# Patient Record
Sex: MALE | Race: OTHER | URBAN - METROPOLITAN AREA
[De-identification: names, ages, dates, MRNs, and addresses within clinical notes are randomized per-mention and may not be internally consistent; named-entity substitution may affect disease eponyms.]

---

## 2022-02-08 ENCOUNTER — OFFICE VISIT (OUTPATIENT)
Dept: URGENT CARE | Age: 22
End: 2022-02-08

## 2022-02-08 VITALS
OXYGEN SATURATION: 99 % | TEMPERATURE: 99.1 F | HEART RATE: 67 BPM | SYSTOLIC BLOOD PRESSURE: 130 MMHG | DIASTOLIC BLOOD PRESSURE: 70 MMHG | RESPIRATION RATE: 16 BRPM | WEIGHT: 143 LBS

## 2022-02-08 DIAGNOSIS — M25.571 ACUTE RIGHT ANKLE PAIN: ICD-10-CM

## 2022-02-08 DIAGNOSIS — S93.401A SPRAIN OF RIGHT ANKLE, UNSPECIFIED LIGAMENT, INITIAL ENCOUNTER: ICD-10-CM

## 2022-02-08 DIAGNOSIS — R07.81 PLEURITIC CHEST PAIN: Primary | ICD-10-CM

## 2022-02-08 PROCEDURE — 99203 OFFICE O/P NEW LOW 30 MIN: CPT | Performed by: FAMILY MEDICINE

## 2022-02-08 NOTE — PROGRESS NOTES
Ayana Haas is a 25 y.o. male who presents with right ankle pain at the lateral aspect x 3 days; reports pain started after twisting ankle while skating. Able to bear weight without pain. Also reports left sided chest pain with deep breathing since this AM, has improved. Denies cough, fever, SOB. Mother reports recently recovered from [facility]. The history is provided by the patient.         Past Medical History:   Diagnosis Date    Allergic rhinitis     Diarrhea     diarrhea for past 6 weeks; just starting to clear    Infectious mononucleosis Oct. 2010    Seasonal allergic rhinitis 4/9/2012    Wears glasses         Past Surgical History:   Procedure Laterality Date    HX OTHER SURGICAL      testicle surgery         Family History   Problem Relation Age of Onset    Anxiety Mother     Colon Polyps Mother    [place] Migraines Mother     Other Maternal Grandmother         diverticulosis    Colon Polyps Maternal Grandmother     Elevated Lipids Maternal Grandfather     Hypertension Maternal Grandfather     Cancer Paternal Grandmother 54        ovarian    Heart Disease Paternal Grandfather         Social History     Socioeconomic History    Marital status: SINGLE     Spouse name: Not on file    Number of children: Not on file    Years of education: Not on file    Highest education level: Not on file   Occupational History    Not on file   Tobacco Use    Smoking status: Never Smoker    Smokeless tobacco: Never Used   Substance and Sexual Activity    Alcohol use: No    Drug use: No    Sexual activity: Not on file   Other Topics Concern    Not on file   Social History Narrative    Not on file     Social Determinants of Health     Financial Resource Strain:     Difficulty of Paying Living Expenses: Not on file   Food Insecurity:     Worried About Running Out of Food in the Last Year: Not on file    Thuy of Food in the Last Year: Not on file   Transportation Needs:     Lack of Transportation (Medical): Not on file    Lack of Transportation (Non-Medical): Not on file   Physical Activity:     Days of Exercise per Week: Not on file    Minutes of Exercise per Session: Not on file   Stress:     Feeling of Stress : Not on file   Social Connections:     Frequency of Communication with Friends and Family: Not on file    Frequency of Social Gatherings with Friends and Family: Not on file    Attends Latter day Services: Not on file    Active Member of [address] [place] [address] [employer] or Organizations: Not on file    Attends Club or Organization Meetings: Not on file    Marital Status: Not on file   Intimate Partner Violence:     Fear of Current or Ex-Partner: Not on file    Emotionally Abused: Not on file    Physically Abused: Not on file    Sexually Abused: Not on file   Housing Stability:     Unable to Pay for Housing in the Last Year: Not on file    Number of [place] in the Last Year: Not on file    Unstable Housing in the Last Year: Not on file                ALLERGIES: Patient has no known allergies. Review of Systems   Respiratory: Negative for shortness of breath. Cardiovascular: Positive for chest pain. Musculoskeletal: Positive for arthralgias. Negative for gait problem and joint swelling. Skin: Negative for wound. Vitals:    02/08/22 1754   BP: 130/70   Pulse: 67   Resp: 16   Temp: 99.1 °F (37.3 °C)   SpO2: 99%   Weight: 143 lb (64.9 kg)       Physical Exam  Vitals and nursing note reviewed. Constitutional:       General: He is not in acute distress. Appearance: He is well-developed. He is not diaphoretic. Cardiovascular:      Rate and Rhythm: Normal rate and regular rhythm. Pulmonary:      Effort: Pulmonary effort is normal. No respiratory distress. Breath sounds: Normal breath sounds. No stridor. No wheezing, rhonchi or rales. Chest:      Chest wall: No tenderness. Musculoskeletal:      Right lower leg: No swelling or tenderness (negative ying's sign). No edema. Right ankle: No swelling. Tenderness present over the lateral malleolus. No medial malleolus, ATF ligament, AITF ligament, posterior TF ligament, base of 5th metatarsal or proximal fibula tenderness. Normal range of motion. Neurological:      Mental Status: He is alert. Psychiatric:         Behavior: Behavior normal.         Thought Content: Thought content normal.         Judgment: Judgment normal.         MDM    ICD-10-CM ICD-9-CM   1. Pleuritic chest pain  R07.81 786.52   2. Acute right ankle pain  M25.571 719.47     338.19   3. Sprain of right ankle, unspecified ligament, initial encounter  S93.401A 845.00       Orders Placed This Encounter    XR CHEST PA LAT     Standing Status:   Future     Number of Occurrences:   1     Standing Expiration Date:   3/11/2023     Order Specific Question:   Reason for Exam     Answer:   left sided CP with deep breathing since this AM    XR ANKLE RT MIN 3 V     Standing Status:   Future     Number of Occurrences:   1     Standing Expiration Date:   2/9/2023     Order Specific Question:   Reason for Exam     Answer:   twisted right ankle while skating 3 days ago, tenderness at lateral malleolus      Elevate right ankle at rest  Deep breathing exercises  Ibuprofen as needed    Low concern for DVT/PE; VSS, no SOB, no calf pain/swelling  Follow up with pcp  ER if short of breath or worsening chest pain    Discussed with Mother    XR Results (most recent):  Results from Appointment encounter on 02/08/22    XR ANKLE RT MIN 3 V    Narrative  EXAM: XR ANKLE RT MIN 3 V    INDICATION: twisted right ankle while skating 3 days ago, tenderness at lateral  malleolus. COMPARISON: None. FINDINGS: Three views of the right ankle demonstrate no fracture or disruption  of the ankle mortise. There there is an ankle effusion and lateral soft tissue  swelling.     Impression  Acute right lateral ankle sprain    Study Result    Narrative & Impression   EXAM: XR CHEST PA LAT     INDICATION: left sided CP with deep breathing since this AM     COMPARISON: 10/20/2010.     FINDINGS: PA and lateral radiographs of the chest demonstrate clear lungs.  The  cardiac and mediastinal contours and pulmonary vascularity are normal. The bones  and soft tissues are within normal limits.      IMPRESSION  Normal chest.       Procedures

## 2022-02-08 NOTE — PATIENT INSTRUCTIONS
Elevate right ankle at rest  Deep breathing exercises  Ibuprofen as needed  Follow up with pcp  ER if short of breath or worsening chest pain       Pleurisy: Care Instructions  Your Care Instructions  Pleurisy is inflammation of the tissue that lines the inside of the chest and covers the lungs (pleura). Pleurisy is often caused by an infection, usually a virus. It also can be caused by other health problems, such as pneumonia or lupus. Pleurisy can cause sharp chest pain that gets worse when you cough or take a deep breath. You may need more tests to find out what is causing your pleurisy. Treatment depends on the cause. Pleurisy may come and go for a few days, or it may continue if the cause has not been treated. Home treatment can help ease symptoms. Follow-up care is a key part of your treatment and safety. Be sure to make and go to all appointments, and call your doctor if you are having problems. It's also a good idea to know your test results and keep a list of the medicines you take. How can you care for yourself at home? · Take an over-the-counter pain medicine, such as acetaminophen (Tylenol), ibuprofen (Advil, Motrin), or naproxen (Aleve). Read and follow all instructions on the label. · Do not take two or more pain medicines at the same time unless the doctor told you to. Many pain medicines have acetaminophen, which is Tylenol. Too much acetaminophen (Tylenol) can be harmful. · If your doctor prescribed antibiotics, take them as directed. Do not stop taking them just because you feel better. You need to take the full course of antibiotics. · Take cough medicine as directed if your doctor recommends it. · Avoid activities that make the pain worse. When should you call for help? Call 911 anytime you think you may need emergency care. For example, call if:    · You have severe trouble breathing.     · You have severe chest pain.     · You passed out (lost consciousness).    Call your doctor now or seek immediate medical care if:    · You have a new or higher fever. Watch closely for changes in your health, and be sure to contact your doctor if:    · You begin to cough up yellow or green mucus.     · You cough up blood.     · Your symptoms are not better in 3 or 4 days. Where can you learn more? Go to http://www.gray.com/  Enter F346 in the search box to learn more about \"Pleurisy: Care Instructions. \"  Current as of: July 6, 2021               Content Version: 13.0  © 8536-2856 [employer]. Care instructions adapted under license by [employer] (which disclaims liability or warranty for this information). If you have questions about a medical condition or this instruction, always ask your healthcare professional. [address] any warranty or liability for your use of this information. Ankle Sprain: Care Instructions  Your Care Instructions     An ankle sprain can happen when you twist your ankle. The ligaments that support the ankle can get stretched and torn. Often the ankle is swollen and painful. Ankle sprains may take from several weeks to several months to heal. Usually, the more pain and swelling you have, the more severe your ankle sprain is and the longer it will take to heal. You can heal faster and regain strength in your ankle with good home treatment. It is very important to give your ankle time to heal completely, so that you do not easily hurt your ankle again. Follow-up care is a key part of your treatment and safety. Be sure to make and go to all appointments, and call your doctor if you are having problems. It's also a good idea to know your test results and keep a list of the medicines you take. How can you care for yourself at home? · Prop up your foot on pillows as much as possible for the next 3 days. Try to keep your ankle above the level of your heart.  This will help reduce the swelling. · Follow your doctor's directions for wearing a splint or elastic bandage. Wrapping the ankle may help reduce or prevent swelling. · Your doctor may give you a splint, a brace, an air stirrup, or another form of ankle support to protect your ankle until it is healed. Wear it as directed while your ankle is healing. Do not remove it unless your doctor tells you to. After your ankle has healed, ask your doctor whether you should wear the brace when you exercise. · Put ice or cold packs on your injured ankle for 10 to 20 minutes at a time. Try to do this every 1 to 2 hours for the next 3 days (when you are awake) or until the swelling goes down. Put a thin cloth between the ice and your skin. · You may need to use crutches until you can walk without pain. If you do use crutches, try to bear some weight on your injured ankle if you can do so without pain. This helps the ankle heal.  · Take pain medicines exactly as directed. ? If the doctor gave you a prescription medicine for pain, take it as prescribed. ? If you are not taking a prescription pain medicine, ask your doctor if you can take an over-the-counter medicine. · If you have been given ankle exercises to do at home, do them exactly as instructed. These can promote healing and help prevent lasting weakness. When should you call for help? Call your doctor now or seek immediate medical care if:    · Your pain is getting worse.     · Your swelling is getting worse.     · Your splint feels too tight or you are unable to loosen it. Watch closely for changes in your health, and be sure to contact your doctor if:    · You are not getting better after 1 week. Where can you learn more? Go to http://www.gray.com/  Enter T341 in the search box to learn more about \"Ankle Sprain: Care Instructions. \"  Current as of: July 1, 2021               Content Version: 13.0  © 0209-7426 [employer], [employer].    Care instructions adapted under license by [employer] (which disclaims liability or warranty for this information). If you have questions about a medical condition or this instruction, always ask your healthcare professional. [address] any warranty or liability for your use of this information.

## 2022-02-11 ENCOUNTER — OFFICE VISIT (OUTPATIENT)
Dept: ORTHOPEDIC SURGERY | Age: 22
End: 2022-02-11
Payer: COMMERCIAL

## 2022-02-11 DIAGNOSIS — S63.501A RIGHT WRIST SPRAIN, INITIAL ENCOUNTER: ICD-10-CM

## 2022-02-11 DIAGNOSIS — M25.531 RIGHT WRIST PAIN: Primary | ICD-10-CM

## 2022-02-11 PROCEDURE — 99203 OFFICE O/P NEW LOW 30 MIN: CPT | Performed by: PHYSICIAN ASSISTANT

## 2022-02-11 NOTE — PROGRESS NOTES
HPI: Imer Mederos (: 2000) is a 25 y.o. male, patient, here for evaluation of the following chief complaint(s): Patient presents with right wrist pain which began 3 months ago after falling off of his skateboard. He has not been evaluated and continues to experience pain at the ulnar aspect of the wrist in various positions. He also states that with his wrist extended and with weightbearing he has pain as well. No other complaints or concerns. He is accompanied by his mother. X-rays of the right wrist obtained today. Wrist Pain (Right wrist pain after falling from skateboard 3 months ago)       Vitals: There were no vitals taken for this visit. There is no height or weight on file to calculate BMI. No Known Allergies    Current Outpatient Medications   Medication Sig    cetirizine (ZYRTEC) 10 mg tablet Take 10 mg by mouth daily.  multivitamin (ONE A DAY) tablet Take 1 Tab by mouth daily. (Patient not taking: Reported on 2022)    mometasone (NASONEX) 50 mcg/actuation nasal spray 2 Sprays by Both Nostrils route daily. (Patient not taking: Reported on 2022)     No current facility-administered medications for this visit.        Past Medical History:   Diagnosis Date    Allergic rhinitis     Diarrhea     diarrhea for past 6 weeks; just starting to clear    Infectious mononucleosis Oct. 2010    Seasonal allergic rhinitis 2012    Wears glasses         Past Surgical History:   Procedure Laterality Date    HX OTHER SURGICAL      testicle surgery       Family History   Problem Relation Age of Onset    Anxiety Mother     Colon Polyps Mother    [facility] Migraines Mother     Other Maternal Grandmother         diverticulosis    Colon Polyps Maternal Grandmother     Elevated Lipids Maternal Grandfather     Hypertension Maternal Grandfather     Cancer Paternal Grandmother 54        ovarian    Heart Disease Paternal Grandfather         Social History     Tobacco Use    Smoking status: Never Smoker    Smokeless tobacco: Never Used   Substance Use Topics    Alcohol use: No    Drug use: No        Review of Systems    Constitutional: No fevers, chills, night sweats, excessive fatigue or weight loss. Musculoskeletal: No joint pain, swelling or redness. No decreased range of motion. Neurologic: No headache, blurred vision, and no areas of focal weakness or numbness. Normal gait. No sensory problems. Respiratory: No dyspnea on exertion, orthopnea, chest pain, cough or hemoptysis. Cardiovascular: No anginal chest pain, irregular heart beat, tachycardia, palpitations or orthopnea  Integumentary: No chronic rashes, inflammation, ulcerations, pruritus, petechiae, purpura, ecchymoses, or skin changes    ROS     Positive for: Musculoskeletal    Last edited by Trav Maynard on 2/11/2022  9:47 AM. (History)             Physical Exam    General: Alert, cooperative, no distress  Musculosketal: Right wrist - Normal range of motion. Normal sensation. No erythema, edema or warmth to the joint. No cysts appreciated at the dorsal or volar aspects. No angulation. Tenderness to palpation along the ulnar aspect of the wrist. Discomfort reproduced with ulnar and radial deviation. Neurologic:  CNII-XII intact, Normal strength, sensation, and reflexes throughout    XR Results (most recent):  Results from Appointment encounter on 02/11/22    XR WRIST RT AP/LAT/OBL MIN 3V    Narrative  Normal osseous and joint space findings. No clear fractures appreciated. ASSESSMENT/PLAN:  Below is the assessment and plan developed based on review of pertinent history, physical exam, labs, studies, and medications. Right wrist pain which began 3 months ago after falling off of his skateboard. He has not been evaluated and continues to experience pain at the ulnar aspect of the wrist in various positions. He also states that with his wrist extended and with weightbearing he has pain as well.   Clinical exam is consistent with possible occult cyst or TFCC derangement. We will obtain an MRI of the right wrist for further evaluation. Patient will follow up after the MRI. In the meantime, he can try a Velcro strap wrist splint for comfort and support during activity and sleep as needed. 1. Right wrist pain  -     XR WRIST RT AP/LAT/OBL MIN 3V; Future  -     MRI WRIST RT WO CONT; Future  2. Right wrist sprain, initial encounter      Return in about 4 weeks (around 3/11/2022). Dr. Verita Goodpasture was available for immediate consult during this encounter. An electronic signature was used to authenticate this note.   -- Fenton Snellen, PA-C

## 2022-02-19 ENCOUNTER — OFFICE VISIT (OUTPATIENT)
Dept: URGENT CARE | Age: 22
End: 2022-02-19
Payer: COMMERCIAL

## 2022-02-19 VITALS
DIASTOLIC BLOOD PRESSURE: 69 MMHG | BODY MASS INDEX: 18.35 KG/M2 | OXYGEN SATURATION: 99 % | SYSTOLIC BLOOD PRESSURE: 111 MMHG | TEMPERATURE: 98.6 F | RESPIRATION RATE: 14 BRPM | HEART RATE: 76 BPM | WEIGHT: 143 LBS | HEIGHT: 74 IN

## 2022-02-19 DIAGNOSIS — M25.572 ACUTE LEFT ANKLE PAIN: ICD-10-CM

## 2022-02-19 DIAGNOSIS — S93.402A SPRAIN OF LEFT ANKLE, UNSPECIFIED LIGAMENT, INITIAL ENCOUNTER: Primary | ICD-10-CM

## 2022-02-19 DIAGNOSIS — M79.672 ACUTE FOOT PAIN, LEFT: ICD-10-CM

## 2022-02-19 PROCEDURE — 99213 OFFICE O/P EST LOW 20 MIN: CPT | Performed by: FAMILY MEDICINE

## 2022-02-19 NOTE — PROGRESS NOTES
Grady Gifford is a 25 y.o. male who presents with left foot/ankle pain and swelling at lateral aspect after mis-stepping going up steps and hyperextended foot. Unable to bear weight due to pain. Reports slight numbness in toes. The history is provided by the patient. Past Medical History:   Diagnosis Date    Allergic rhinitis     Diarrhea     diarrhea for past 6 weeks; just starting to clear    Infectious mononucleosis Oct. 2010    Seasonal allergic rhinitis 4/9/2012    Wears glasses         Past Surgical History:   Procedure Laterality Date    HX OTHER SURGICAL      testicle surgery         Family History   Problem Relation Age of Onset    Anxiety Mother     Colon Polyps Mother    [facility] Migraines Mother     Other Maternal Grandmother         diverticulosis    Colon Polyps Maternal Grandmother     Elevated Lipids Maternal Grandfather     Hypertension Maternal Grandfather     Cancer Paternal Grandmother 54        ovarian    Heart Disease Paternal Grandfather         Social History     Socioeconomic History    Marital status: SINGLE     Spouse name: Not on file    Number of children: Not on file    Years of education: Not on file    Highest education level: Not on file   Occupational History    Not on file   Tobacco Use    Smoking status: Never Smoker    Smokeless tobacco: Never Used   Substance and Sexual Activity    Alcohol use: No    Drug use: No    Sexual activity: Not on file   Other Topics Concern    Not on file   Social History Narrative    Not on file     Social Determinants of Health     Financial Resource Strain:     Difficulty of Paying Living Expenses: Not on file   Food Insecurity:     Worried About Running Out of Food in the Last Year: Not on file    Thuy of Food in the Last Year: Not on file   Transportation Needs:     Lack of Transportation (Medical): Not on file    Lack of Transportation (Non-Medical):  Not on file   Physical Activity:     Days of Exercise per Week: Not on file    Minutes of Exercise per Session: Not on file   Stress:     Feeling of Stress : Not on file   Social Connections:     Frequency of Communication with Friends and Family: Not on file    Frequency of Social Gatherings with Friends and Family: Not on file    Attends Mu-ism Services: Not on file    Active Member of [address] [place] [address] or Organizations: Not on file    Attends Club or Organization Meetings: Not on file    Marital Status: Not on file   Intimate Partner Violence:     Fear of Current or Ex-Partner: Not on file    Emotionally Abused: Not on file    Physically Abused: Not on file    Sexually Abused: Not on file   Housing Stability:     Unable to Pay for Housing in the Last Year: Not on file    Number of [place] in the Last Year: Not on file    Unstable Housing in the Last Year: Not on file                ALLERGIES: Patient has no known allergies. Review of Systems   Musculoskeletal: Positive for arthralgias, gait problem and joint swelling. Skin: Negative for color change. Vitals:    02/19/22 1829   BP: 111/69   Pulse: 76   Resp: 14   Temp: 98.6 °F (37 °C)   SpO2: 99%   Weight: 143 lb (64.9 kg)   Height: 6' 2\" (1.88 m)       Physical Exam  Vitals and nursing note reviewed. Constitutional:       General: He is not in acute distress. Appearance: He is well-developed. He is not diaphoretic. Pulmonary:      Effort: Pulmonary effort is normal.   Musculoskeletal:      Left ankle: Swelling (lateral aspect below malleolus) present. Tenderness (lateral aspect below malleolus) present over the ATF ligament. No lateral malleolus, medial malleolus, AITF ligament, CF ligament, posterior TF ligament, base of 5th metatarsal or proximal fibula tenderness. Decreased range of motion. Left foot: Swelling (lateral aspect below malleolus), tenderness (lateral aspect below malleolus) and bony tenderness (lateral aspect below malleolus) present.         Feet:    Neurological: Mental Status: He is alert. Psychiatric:         Behavior: Behavior normal.         Thought Content: Thought content normal.         Judgment: Judgment normal.         MDM    ICD-10-CM ICD-9-CM   1. Sprain of left ankle, unspecified ligament, initial encounter  S93.402A 845.00   2. Acute left ankle pain  M25.572 719.47   3. Acute foot pain, left  M79.672 729.5       Orders Placed This Encounter    ACE WRAP    XR ANKLE LT MIN 3 V     Standing Status:   Future     Number of Occurrences:   1     Standing Expiration Date:   3/19/2023     Order Specific Question:   Reason for Exam     Answer:   twisted ankle PTA while going up steps    XR FOOT LT MIN 3 V     Standing Status:   Future     Number of Occurrences:   1     Standing Expiration Date:   3/19/2023     Order Specific Question:   Reason for Exam     Answer:   twisted foot PTA while going up steps    CRUTCHES        Elevate at rest  Ice  Tylenol or Ibuprofen as needed  Crutches until able to bear weight  Follow up with Ortho     If signs and symptoms become worse the pt is to go to the ER. XR Results (most recent):  Results from Appointment encounter on 02/19/22    XR FOOT LT MIN 3 V    Narrative  EXAM: XR FOOT LT MIN 3 V, XR ANKLE LT MIN 3 V    INDICATION: twisted foot PTA while going up steps. COMPARISON: None. FINDINGS: Three views of the left foot. 3 views of the left ankle. There is a  small area of cortical thickening in the distal fibular shaft likely related to  prior trauma. The ankle mortise is intact. No significant tibiotalar joint  effusion. No acute fracture or dislocation. There is soft tissue swelling  lateral to the calcaneocuboid joint. Impression  Soft tissue swelling lateral to the calcaneocuboid joint. .      Procedures

## 2022-02-20 NOTE — PATIENT INSTRUCTIONS
Elevate at rest  Tylenol or Ibuprofen as needed  Crutches until able to bear weight  Follow up with PCP or Ortho        Ankle Sprain: Rehab Exercises  Introduction  Here are some examples of exercises for you to try. The exercises may be suggested for a condition or for rehabilitation. Start each exercise slowly. Ease off the exercises if you start to have pain. You will be told when to start these exercises and which ones will work best for you. How to do the exercises  'Alphabet' exercise    1. Trace the alphabet with your toe. This helps your ankle move in all directions. Side-to-side knee swing exercise    1. Sit in a chair with your foot flat on the floor. 2. Slowly move your knee from side to side. Keep your foot pressed flat. 3. Continue this exercise for 2 to 3 minutes. Towel curl    1. While sitting, place your foot on a towel on the floor. Scrunch the towel toward you with your toes. 2. Then use your toes to push the towel away from you. 3. To make this exercise more challenging you can put something on the other end of the towel. A can of soup is about the right weight for this. Towel stretch    1. Sit with your legs extended and knees straight. 2. Place a towel around your foot just under the toes. 3. Hold each end of the towel in each hand, with your hands above your knees. 4. Pull back with the towel so that your foot stretches toward you. 5. Hold the position for at least 15 to 30 seconds. 6. Repeat 2 to 4 times a session. Do up to 5 sessions a day. Ankle eversion exercise    1. Start by sitting with your foot flat on the floor. Push your foot outward against a wall or a piece of furniture that doesn't move. Hold for about 6 seconds, and relax. Repeat 8 to 12 times. 2. After you feel comfortable with this, try using rubber tubing looped around the outside of your feet for resistance.  Push your foot out to the side against the tubing, and then count to 10 as you slowly bring your foot back to the middle. Repeat 8 to 12 times. Isometric opposition exercises    1. While sitting, put your feet together flat on the floor. 2. Press your injured foot inward against your other foot. Hold for about 6 seconds, and relax. Repeat 8 to 12 times. 3. Then place the heel of your other foot on top of the injured one. Push down with the top heel while trying to push up with your injured foot. Hold for about 6 seconds, and relax. Repeat 8 to 12 times. Resisted ankle inversion    1. Sit on the floor with your good leg crossed over your other leg. 2. Hold both ends of an exercise band and loop the band around the inside of your affected foot. Then press your other foot against the band. 3. Keeping your legs crossed, slowly push your affected foot against the band so that foot moves away from your other foot. Then slowly relax. 4. Repeat 8 to 12 times. Resisted ankle eversion    1. Sit on the floor with your legs straight. 2. Hold both ends of an exercise band and loop the band around the outside of your affected foot. Then press your other foot against the band. 3. Keeping your leg straight, slowly push your affected foot outward against the band and away from your other foot without letting your leg rotate. Then slowly relax. 4. Repeat 8 to 12 times. Resisted ankle dorsiflexion    1. Tie the ends of an exercise band together to form a loop. Attach one end of the loop to a secure object or shut a door on it to hold it in place. (Or you can have someone hold one end of the loop to provide resistance.)  2. While sitting on the floor or in a chair, loop the other end of the band over the top of your affected foot. 3. Keeping your knee and leg straight, slowly flex your foot to pull back on the exercise band, and then slowly relax. 4. Repeat 8 to 12 times. Single-leg balance    1. Stand on a flat surface with your arms stretched out to your sides like you are making the letter \"T. \" Then lift your good leg off the floor, bending it at the knee. If you are not steady on your feet, use one hand to hold on to a chair, counter, or wall. 2. Standing on the leg with your affected ankle, keep that knee straight. Try to balance on that leg for up to 30 seconds. Then rest for up to 10 seconds. 3. Repeat 6 to 8 times. 4. When you can balance on your affected leg for 30 seconds with your eyes open, try to balance on it with your eyes closed. 5. When you can do this exercise with your eyes closed for 30 seconds and with ease and no pain, try standing on a pillow or piece of foam, and repeat steps 1 through 4. Follow-up care is a key part of your treatment and safety. Be sure to make and go to all appointments, and call your doctor if you are having problems. It's also a good idea to know your test results and keep a list of the medicines you take. Where can you learn more? Go to http://www.gray.com/  Enter Mikayla Youngblood in the search box to learn more about \"Ankle Sprain: Rehab Exercises. \"  Current as of: July 1, 2021               Content Version: 13.0  © 2097-8965 [employer]. Care instructions adapted under license by [employer] (which disclaims liability or warranty for this information). If you have questions about a medical condition or this instruction, always ask your healthcare professional. [place] [address] any warranty or liability for your use of this information. Ankle Sprain: Care Instructions  Your Care Instructions     An ankle sprain can happen when you twist your ankle. The ligaments that support the ankle can get stretched and torn. Often the ankle is swollen and painful.   Ankle sprains may take from several weeks to several months to heal. Usually, the more pain and swelling you have, the more severe your ankle sprain is and the longer it will take to heal. You can heal faster and regain strength in your ankle with good home treatment. It is very important to give your ankle time to heal completely, so that you do not easily hurt your ankle again. Follow-up care is a key part of your treatment and safety. Be sure to make and go to all appointments, and call your doctor if you are having problems. It's also a good idea to know your test results and keep a list of the medicines you take. How can you care for yourself at home? · Prop up your foot on pillows as much as possible for the next 3 days. Try to keep your ankle above the level of your heart. This will help reduce the swelling. · Follow your doctor's directions for wearing a splint or elastic bandage. Wrapping the ankle may help reduce or prevent swelling. · Your doctor may give you a splint, a brace, an air stirrup, or another form of ankle support to protect your ankle until it is healed. Wear it as directed while your ankle is healing. Do not remove it unless your doctor tells you to. After your ankle has healed, ask your doctor whether you should wear the brace when you exercise. · Put ice or cold packs on your injured ankle for 10 to 20 minutes at a time. Try to do this every 1 to 2 hours for the next 3 days (when you are awake) or until the swelling goes down. Put a thin cloth between the ice and your skin. · You may need to use crutches until you can walk without pain. If you do use crutches, try to bear some weight on your injured ankle if you can do so without pain. This helps the ankle heal.  · Take pain medicines exactly as directed. ? If the doctor gave you a prescription medicine for pain, take it as prescribed. ? If you are not taking a prescription pain medicine, ask your doctor if you can take an over-the-counter medicine. · If you have been given ankle exercises to do at home, do them exactly as instructed. These can promote healing and help prevent lasting weakness. When should you call for help?    Call your doctor now or seek immediate medical care if:    · Your pain is getting worse.     · Your swelling is getting worse.     · Your splint feels too tight or you are unable to loosen it. Watch closely for changes in your health, and be sure to contact your doctor if:    · You are not getting better after 1 week. Where can you learn more? Go to http://www.gray.com/  Enter I430 in the search box to learn more about \"Ankle Sprain: Care Instructions. \"  Current as of: July 1, 2021               Content Version: 13.0  © 9788-0067 [employer]. Care instructions adapted under license by [email or web address] (which disclaims liability or warranty for this information). If you have questions about a medical condition or this instruction, always ask your healthcare professional. [address] any warranty or liability for your use of this information.

## 2022-02-25 ENCOUNTER — OFFICE VISIT (OUTPATIENT)
Dept: CARDIOLOGY CLINIC | Age: 22
End: 2022-02-25
Payer: COMMERCIAL

## 2022-02-25 VITALS
HEIGHT: 74 IN | OXYGEN SATURATION: 98 % | HEART RATE: 55 BPM | RESPIRATION RATE: 16 BRPM | WEIGHT: 149 LBS | BODY MASS INDEX: 19.12 KG/M2 | SYSTOLIC BLOOD PRESSURE: 98 MMHG | DIASTOLIC BLOOD PRESSURE: 60 MMHG

## 2022-02-25 DIAGNOSIS — R01.1 MURMUR: Primary | ICD-10-CM

## 2022-02-25 PROCEDURE — 99204 OFFICE O/P NEW MOD 45 MIN: CPT | Performed by: SPECIALIST

## 2022-02-25 RX ORDER — CLINDAMYCIN PHOSPHATE AND BENZOYL PEROXIDE 10; 50 MG/G; MG/G
GEL TOPICAL
COMMUNITY
Start: 2022-02-22

## 2022-02-25 RX ORDER — TRETINOIN 0.25 MG/G
CREAM TOPICAL
COMMUNITY
Start: 2022-02-22

## 2022-02-25 NOTE — PROGRESS NOTES
HISTORY OF PRESENT ILLNESS  Dedrick Farias is a 25 y.o. male     SUMMARY:   Problem List  Date Reviewed: 2/25/2022          Codes Class Noted    Right wrist sprain, initial encounter ICD-10-CM: S63.501A  ICD-9-CM: 842.00  2/11/2022        Diarrhea ICD-10-CM: R19.7  ICD-9-CM: 787.91  10/17/2013        Abdominal cramping ICD-10-CM: R10.9  ICD-9-CM: 789.00  10/17/2013        Nausea ICD-10-CM: R11.0  ICD-9-CM: 787.02  10/17/2013        Seasonal allergic rhinitis ICD-10-CM: J30.2  ICD-9-CM: 477.9  4/9/2012              Current Outpatient Medications on File Prior to Visit   Medication Sig    clindamycin-benzoyl Peroxide (DUAC) 1.2 %(1 % base) -5 % SR topical gel     tretinoin (RETIN-A) 0.025 % topical cream     cetirizine (ZYRTEC) 10 mg tablet Take 10 mg by mouth daily. No current facility-administered medications on file prior to visit. CARDIOLOGY STUDIES TO DATE:  No specialty comments available. Chief Complaint   Patient presents with    New Patient     HPI :  He is referred by his primary care for cardiac evaluation. About 4 to 5 years ago his pediatrician was concerned that he might have Marfan syndrome so they were sent to a pediatric cardiologist who thought he did not. As part of that evaluation he had an echocardiogram and his mother says that they was told he had some sort of abnormality that needed to be followed up in a few years so that is why they are here. Unfortunately she cannot locate that prior cardiologist or any records so far. He was healthy as a child and has had no major surgeries. He is very active exercising combination of cardio skateboarding calisthenics and light weights with no cardiac type symptoms. He does have a history of vasovagal syncope most recently when he broke his arm. There is no history of hypertension or diabetes cholesterol status is unknown he does not smoke and family history is negative for premature coronary disease.   He has occasional problems with sharp localized rib pain on the left side which sounds musculoskeletal.  CARDIAC ROS:   negative for dyspnea, palpitations, syncope, orthopnea, paroxysmal nocturnal dyspnea, exertional chest pressure/discomfort, claudication, lower extremity edema    Family History   Problem Relation Age of Onset    Anxiety Mother     Colon Polyps Mother    [place]len[place] Alu Migraines Mother     Other Maternal Grandmother         diverticulosis    Colon Polyps Maternal Grandmother     Elevated Lipids Maternal Grandfather     Hypertension Maternal Grandfather     Cancer Paternal Grandmother 54        ovarian    Heart Disease Paternal Grandfather        Past Medical History:   Diagnosis Date    Allergic rhinitis     Diarrhea     diarrhea for past 6 weeks; just starting to clear    Infectious mononucleosis Oct. 2010    Seasonal allergic rhinitis 4/9/2012    Wears glasses        GENERAL ROS:  A comprehensive review of systems was negative except for that written in the HPI. Visit Vitals  BP 98/60   Pulse (!) 55   Resp 16   Ht 6' 2\" (1.88 m)   Wt 149 lb (67.6 kg)   SpO2 98%   BMI 19.13 kg/m²       Wt Readings from Last 3 Encounters:   02/25/22 149 lb (67.6 kg) (40 %, Z= -0.25)*   02/19/22 143 lb (64.9 kg) (30 %, Z= -0.52)*   02/08/22 143 lb (64.9 kg) (30 %, Z= -0.52)*     * Growth percentiles are based on CDC (Boys, 2-20 Years) data. BP Readings from Last 3 Encounters:   02/25/22 98/60   02/19/22 111/69   02/08/22 130/70       PHYSICAL EXAM  General appearance: alert, cooperative, no distress, appears stated age  Neurologic: Alert and oriented X 3  Neck: supple, symmetrical, trachea midline, no adenopathy, no carotid bruit and no JVD  Lungs: clear to auscultation bilaterally  Heart: regular rate and rhythm, S1, S2 normal, no S3 or S4, systolic murmur: early systolic 1/6, crescendo at 2nd left intercostal space  Abdomen: soft, non-tender.  Bowel sounds normal. No masses,  no organomegaly  Extremities: extremities normal, atraumatic, no cyanosis or edema  Pulses: 2+ and symmetric      ASSESSMENT :      I am not sure what to make of his past history with some abnormality on his echo. Does have a soft systolic murmur but no cardiac type symptoms. His mother will continue to try to find old records to see if we can sort through that but in the meantime I think we should get an echocardiogram to make sure he does not need any further cardiac follow-up. current treatment plan is effective, no change in therapy  lab results and schedule of future lab studies reviewed with patient  reviewed diet, exercise and weight control    Encounter Diagnoses   Name Primary?  Murmur Yes     Orders Placed This Encounter    clindamycin-benzoyl Peroxide (DUAC) 1.2 %(1 % base) -5 % SR topical gel    tretinoin (RETIN-A) 0.025 % topical cream       Follow-up and Dispositions    · Return if symptoms worsen or fail to improve. Dayron Delarosa MD  2/25/2022  Please note that this dictation was completed with [employer], the computer voice recognition software. Quite often unanticipated grammatical, syntax, homophones, and other interpretive errors are inadvertently transcribed by the computer software. Please disregard these errors. Please excuse any errors that have escaped final proofreading. Thank you.

## 2022-03-18 PROBLEM — S63.501A RIGHT WRIST SPRAIN, INITIAL ENCOUNTER: Status: ACTIVE | Noted: 2022-02-11

## 2022-04-01 ENCOUNTER — TELEPHONE (OUTPATIENT)
Dept: CARDIOLOGY CLINIC | Age: 22
End: 2022-04-01

## 2022-04-01 ENCOUNTER — ANCILLARY PROCEDURE (OUTPATIENT)
Dept: CARDIOLOGY CLINIC | Age: 22
End: 2022-04-01
Payer: COMMERCIAL

## 2022-04-01 VITALS
WEIGHT: 149 LBS | SYSTOLIC BLOOD PRESSURE: 98 MMHG | BODY MASS INDEX: 19.12 KG/M2 | HEIGHT: 74 IN | DIASTOLIC BLOOD PRESSURE: 60 MMHG

## 2022-04-01 DIAGNOSIS — R01.1 MURMUR: ICD-10-CM

## 2022-04-01 LAB
ECHO AO ROOT DIAM: 2.7 CM
ECHO AO ROOT INDEX: 1.41 CM/M2
ECHO AV PEAK GRADIENT: 6 MMHG
ECHO AV PEAK VELOCITY: 1.2 M/S
ECHO AV VELOCITY RATIO: 0.83
ECHO EST RA PRESSURE: 8 MMHG
ECHO LA DIAMETER INDEX: 1.41 CM/M2
ECHO LA DIAMETER: 2.7 CM
ECHO LA TO AORTIC ROOT RATIO: 1
ECHO LA VOL 2C: 20 ML (ref 18–58)
ECHO LA VOL 4C: 26 ML (ref 18–58)
ECHO LA VOL BP: 24 ML (ref 18–58)
ECHO LA VOL/BSA BIPLANE: 13 ML/M2 (ref 16–34)
ECHO LA VOLUME AREA LENGTH: 26 ML
ECHO LA VOLUME INDEX A2C: 10 ML/M2 (ref 16–34)
ECHO LA VOLUME INDEX A4C: 14 ML/M2 (ref 16–34)
ECHO LA VOLUME INDEX AREA LENGTH: 14 ML/M2 (ref 16–34)
ECHO LV E' LATERAL VELOCITY: 17 CM/S
ECHO LV E' SEPTAL VELOCITY: 16 CM/S
ECHO LV FRACTIONAL SHORTENING: 36 % (ref 28–44)
ECHO LV INTERNAL DIMENSION DIASTOLE INDEX: 2.34 CM/M2
ECHO LV INTERNAL DIMENSION DIASTOLIC: 4.5 CM (ref 4.2–5.9)
ECHO LV INTERNAL DIMENSION SYSTOLIC INDEX: 1.51 CM/M2
ECHO LV INTERNAL DIMENSION SYSTOLIC: 2.9 CM
ECHO LV ISOVOLUMETRIC RELAXATION TIME (IVRT): 74.2 MS
ECHO LV IVSD: 0.9 CM (ref 0.6–1)
ECHO LV MASS 2D: 142.9 G (ref 88–224)
ECHO LV MASS INDEX 2D: 74.4 G/M2 (ref 49–115)
ECHO LV POSTERIOR WALL DIASTOLIC: 1 CM (ref 0.6–1)
ECHO LV RELATIVE WALL THICKNESS RATIO: 0.44
ECHO LVOT PEAK GRADIENT: 4 MMHG
ECHO LVOT PEAK VELOCITY: 1 M/S
ECHO MV "A" WAVE DURATION: 137 MSEC
ECHO MV A VELOCITY: 0.43 M/S
ECHO MV AREA PHT: 3.7 CM2
ECHO MV E DECELERATION TIME (DT): 207.4 MS
ECHO MV E VELOCITY: 0.72 M/S
ECHO MV E/A RATIO: 1.67
ECHO MV E/E' LATERAL: 4.24
ECHO MV E/E' RATIO (AVERAGED): 4.37
ECHO MV E/E' SEPTAL: 4.5
ECHO MV PRESSURE HALF TIME (PHT): 60.2 MS
ECHO RIGHT VENTRICULAR SYSTOLIC PRESSURE (RVSP): 26 MMHG
ECHO RV FREE WALL PEAK S': 15 CM/S
ECHO RV TAPSE: 2.4 CM (ref 1.5–2)
ECHO TV REGURGITANT MAX VELOCITY: 2.14 M/S
ECHO TV REGURGITANT PEAK GRADIENT: 18 MMHG

## 2022-04-01 PROCEDURE — 93306 TTE W/DOPPLER COMPLETE: CPT | Performed by: SPECIALIST

## 2022-04-01 NOTE — TELEPHONE ENCOUNTER
----- Message from Nahun Jackson MD sent at 4/1/2022  2:34 PM EDT -----  Heart muscle is strong. Couple of mildly leaky valves, not a worry or causing any problems at this point. Should repeat echo in 4 to 5 yrs.  Looks good

## 2022-04-01 NOTE — PROGRESS NOTES
Heart muscle is strong. Couple of mildly leaky valves, not a worry or causing any problems at this point. Should repeat echo in 4 to 5 yrs.  Looks good

## 2022-05-25 ENCOUNTER — OFFICE VISIT (OUTPATIENT)
Dept: ORTHOPEDIC SURGERY | Age: 22
End: 2022-05-25
Payer: COMMERCIAL

## 2022-05-25 VITALS — BODY MASS INDEX: 19.12 KG/M2 | WEIGHT: 149 LBS | HEIGHT: 74 IN

## 2022-05-25 DIAGNOSIS — S63.501D RIGHT WRIST SPRAIN, SUBSEQUENT ENCOUNTER: ICD-10-CM

## 2022-05-25 DIAGNOSIS — M25.531 RIGHT WRIST PAIN: Primary | ICD-10-CM

## 2022-05-25 DIAGNOSIS — T14.8XXA CONTUSION OF BONE: ICD-10-CM

## 2022-05-25 PROCEDURE — 99213 OFFICE O/P EST LOW 20 MIN: CPT | Performed by: PHYSICIAN ASSISTANT

## 2022-05-25 NOTE — PROGRESS NOTES
HPI: Stephanie Leonard (: 2000) is a 23 y.o. male, patient, here for evaluation of the following chief complaint(s): Patient presents with right wrist pain which began 3 months ago after falling off of his skateboard. He has not been evaluated and continues to experience pain at the ulnar aspect of the wrist in various positions. He also states that with his wrist extended and with weightbearing he has pain as well. Patient presents in follow up. He was last seen in the office on 22. He was sent for a right wrist MRI. He presents today to review the results. The right wrist continues to bother him in the extended position with weight bearing, but he reports the pain has improved since his initial visit. He is accompanied by his mother. Wrist Pain (Right wrist pain after fall in 2021. MRI results)       Vitals:  Ht 6' 2\" (1.88 m)   Wt 149 lb (67.6 kg)   BMI 19.13 kg/m²    Body mass index is 19.13 kg/m². No Known Allergies    Current Outpatient Medications   Medication Sig    clindamycin-benzoyl Peroxide (DUAC) 1.2 %(1 % base) -5 % SR topical gel     tretinoin (RETIN-A) 0.025 % topical cream     cetirizine (ZYRTEC) 10 mg tablet Take 10 mg by mouth daily. No current facility-administered medications for this visit.        Past Medical History:   Diagnosis Date    Allergic rhinitis     Diarrhea     diarrhea for past 6 weeks; just starting to clear    Infectious mononucleosis Oct. 2010    Seasonal allergic rhinitis 2012    Wears glasses         Past Surgical History:   Procedure Laterality Date    HX OTHER SURGICAL      testicle surgery       Family History   Problem Relation Age of Onset    Anxiety Mother     Colon Polyps Mother    [place] Yumiko Migraines Mother     Other Maternal Grandmother         diverticulosis    Colon Polyps Maternal Grandmother     Elevated Lipids Maternal Grandfather     Hypertension Maternal Grandfather     Cancer Paternal Grandmother 54 ovarian    Heart Disease Paternal Grandfather         Social History     Tobacco Use    Smoking status: Never Smoker    Smokeless tobacco: Never Used   Substance Use Topics    Alcohol use: No    Drug use: No        Review of Systems    Constitutional: No fevers, chills, night sweats, excessive fatigue or weight loss. Musculoskeletal: No joint pain, swelling or redness. No decreased range of motion. Neurologic: No headache, blurred vision, and no areas of focal weakness or numbness. Normal gait. No sensory problems. Respiratory: No dyspnea on exertion, orthopnea, chest pain, cough or hemoptysis. Cardiovascular: No anginal chest pain, irregular heart beat, tachycardia, palpitations or orthopnea  Integumentary: No chronic rashes, inflammation, ulcerations, pruritus, petechiae, purpura, ecchymoses, or skin changes    ROS     Positive for: Musculoskeletal    Last edited by Madhavi Montanez on 5/25/2022  1:07 PM. (History)           Physical Exam    General: Alert, cooperative, no distress  Musculosketal: Right wrist - Normal range of motion. Normal sensation. No erythema, edema or warmth to the joint. No cysts appreciated at the dorsal or volar aspects. No angulation. Mild tenderness to palpation along the ulnar aspect of the wrist. Mild discomfort reproduced with ulnar and radial deviation. Neurologic:  CNII-XII intact, Normal strength, sensation, and reflexes throughout    Imaging: MRI right wrist  IMPRESSION  Mild edema in the triquetrum which may related to contusion. No evidence of a  fracture line. ASSESSMENT/PLAN:  Below is the assessment and plan developed based on review of pertinent history, physical exam, labs, studies, and medications. Right wrist pain which began 3 months ago after falling off of his skateboard. He has not been evaluated and continues to experience pain at the ulnar aspect of the wrist in various positions.   He also states that with his wrist extended and with weightbearing he has pain as well. Patient presents in follow up. He was last seen in the office on 2/11/22. He was sent for a right wrist MRI. He presents today to review the results. The right wrist continues to bother him in the extended position with weight bearing, but he reports the pain has improved since his initial visit. MRI revealed a contusion to the right triquetrum. Discussed with the patient and his mother. He may resume normal activities as tolerated. Follow up as needed. 1. Right wrist pain  2. Right wrist sprain, subsequent encounter  3. Contusion of bone      Return if symptoms worsen or fail to improve. Dr. Deja Marrufo was available for immediate consult during this encounter. An electronic signature was used to authenticate this note.   -- Cam Rivera PA-C

## 2022-07-12 ENCOUNTER — TELEPHONE (OUTPATIENT)
Dept: PEDIATRICS CLINIC | Age: 22
End: 2022-07-12

## 2022-07-21 ENCOUNTER — OFFICE VISIT (OUTPATIENT)
Dept: PRIMARY CARE CLINIC | Age: 22
End: 2022-07-21
Payer: COMMERCIAL

## 2022-07-21 VITALS
OXYGEN SATURATION: 98 % | HEART RATE: 72 BPM | BODY MASS INDEX: 17.97 KG/M2 | TEMPERATURE: 98.3 F | HEIGHT: 74 IN | DIASTOLIC BLOOD PRESSURE: 74 MMHG | SYSTOLIC BLOOD PRESSURE: 114 MMHG | RESPIRATION RATE: 16 BRPM | WEIGHT: 140 LBS

## 2022-07-21 DIAGNOSIS — N34.2 URETHRITIS: Primary | ICD-10-CM

## 2022-07-21 DIAGNOSIS — R30.0 DYSURIA: ICD-10-CM

## 2022-07-21 LAB
BILIRUB UR QL STRIP: NEGATIVE
GLUCOSE UR-MCNC: NEGATIVE MG/DL
KETONES P FAST UR STRIP-MCNC: NEGATIVE MG/DL
PH UR STRIP: 7 [PH] (ref 4.6–8)
PROT UR QL STRIP: NEGATIVE
SP GR UR STRIP: 1.03 (ref 1–1.03)
UA UROBILINOGEN AMB POC: NORMAL (ref 0.2–1)
URINALYSIS CLARITY POC: CLEAR
URINALYSIS COLOR POC: YELLOW
URINE BLOOD POC: NEGATIVE
URINE LEUKOCYTES POC: NEGATIVE
URINE NITRITES POC: NEGATIVE

## 2022-07-21 PROCEDURE — 99203 OFFICE O/P NEW LOW 30 MIN: CPT | Performed by: NURSE PRACTITIONER

## 2022-07-21 PROCEDURE — 81003 URINALYSIS AUTO W/O SCOPE: CPT | Performed by: NURSE PRACTITIONER

## 2022-07-21 RX ORDER — DOXYCYCLINE 100 MG/1
100 CAPSULE ORAL 2 TIMES DAILY
Qty: 14 CAPSULE | Refills: 0 | Status: SHIPPED | OUTPATIENT
Start: 2022-07-21 | End: 2022-07-28

## 2022-07-21 NOTE — PROGRESS NOTES
Elliott Lambert (: 2000) is a 23 y.o. male is here for evaluation of the following chief complaint(s): Sexually Transmitted Disease (Pt states that it hurts to urinate and thinks he may have a sexually transmitted disease or a urinary tract infection. Pain upon urination but no frequency or urgency. He states that he has not noticed any blood in urine. Last sexually active on  . Has not had sex with multiple partners.)    Subjective/Objective:   He complains of dysuria for 2 days. He denies frequency, urgency, hematuria, nausea, vomiting, pain in in the entire abdomen, fevers, chills. Denies any urethral discharge or lesions. Since starting he reports his symptoms are not changed. Treatments tried: none. He is sexually active with females. New partner for a month. Last sexual activity 4 days ago. ROS:  Pertinent items are noted in HPI. Physical Exam:  General: alert, cooperative, no distress  Abdomen: soft, nontender, nondistended, no masses or organomegaly  Back: CVA tenderness absent  : exam deferred  /74   Pulse 72   Temp 98.3 °F (36.8 °C) (Temporal)   Resp 16   Ht 6' 2\" (1.88 m)   Wt 140 lb (63.5 kg)   SpO2 98%   BMI 17.97 kg/m²        Results for orders placed or performed in visit on 22   AMB POC URINALYSIS DIP STICK AUTO W/O MICRO   Result Value Ref Range    Color (UA POC) Yellow     Clarity (UA POC) Clear     Glucose (UA POC) Negative Negative    Bilirubin (UA POC) Negative Negative    Ketones (UA POC) Negative Negative    Specific gravity (UA POC) 1.030 1.001 - 1.035    Blood (UA POC) Negative Negative    pH (UA POC) 7.0 4.6 - 8.0    Protein (UA POC) Negative Negative    Urobilinogen (UA POC) 0.2 mg/dL 0.2 - 1    Nitrites (UA POC) Negative Negative    Leukocyte esterase (UA POC) Negative Negative       Assessment/Plan:   1. Urethritis  -     AMB POC URINALYSIS DIP STICK AUTO W/O MICRO  -     CULTURE, URINE;  Future  -     CT/NG/T.VAGINALIS AMPLIFICATION; Future  2. Dysuria  -     AMB POC URINALYSIS DIP STICK AUTO W/O MICRO  -     CULTURE, URINE; Future  -     CT/NG/T.VAGINALIS AMPLIFICATION; Future    Orders Placed This Encounter    CULTURE, URINE    CT/NG/T.VAGINALIS AMPLIFICATION    AMB POC URINALYSIS DIP STICK AUTO W/O MICRO    doxycycline (MONODOX) 100 mg capsule      Precautions given with pending test results. No sexual activity during treatment and until sx resolve. If positive, partner should be treated prior to resuming sexual activity. The above diagnosis is a new problem. We discussed expected course, resolution, and complications of diagnosis in detail. I advised him to call back or return to office if symptoms worsen/change/persist.    Return if symptoms worsen or fail to improve. An electronic signature was used to authenticate this note.   -- Malcom Robert NP

## 2022-07-21 NOTE — PROGRESS NOTES
Chief Complaint   Patient presents with    Sexually Transmitted Disease     Pt states that it hurts to urinate and thinks he may have a sexually transmitted disease or a urinary tract infection. Pain upon urination but no frequency or urgency. He states that he has not noticed any blood in urine. Last sexually active on Sunday . Has not had sex with multiple partners.    Visit Vitals  /74   Pulse 72   Temp 98.3 °F (36.8 °C) (Temporal)   Resp 16   Ht 6' 2\" (1.88 m)   Wt 140 lb (63.5 kg)   SpO2 98%   BMI 17.97 kg/m²

## 2022-07-25 ENCOUNTER — TELEPHONE (OUTPATIENT)
Dept: PRIMARY CARE CLINIC | Age: 22
End: 2022-07-25

## 2022-07-25 NOTE — TELEPHONE ENCOUNTER
Pt and mom of pt calling to get results from appointment on 7/21/22.      Best contact: [phone]

## 2022-07-26 ENCOUNTER — TELEPHONE (OUTPATIENT)
Dept: PRIMARY CARE CLINIC | Age: 22
End: 2022-07-26

## 2022-07-26 NOTE — TELEPHONE ENCOUNTER
Called pt with test results from recent office visit. Results received via fax. [facility]. Urine culture negative, gonorrhea and chlamydia are negative. Pt is taking doxy and continues to have dysuria. Recommend pt follow-up [facility] for his continued symptoms. No further questions. Labs to be scanned in to media.

## 2022-07-27 ENCOUNTER — TELEPHONE (OUTPATIENT)
Dept: PRIMARY CARE CLINIC | Age: 22
End: 2022-07-27

## 2022-07-27 NOTE — TELEPHONE ENCOUNTER
Returned call to Melody Farmer. Discussed with Mom that testing was negative for gonorrhea and chlamydia. Urine culture negative for UTI. Continues to have dysuria. Taking doxy day 6/7. Recommend evaluation by Urology [facility]). Will mail copies of recent testing as not in CC due to interface issue. No further questions.

## 2023-02-16 ENCOUNTER — OFFICE VISIT (OUTPATIENT)
Dept: ORTHOPEDIC SURGERY | Age: 23
End: 2023-02-16
Payer: COMMERCIAL

## 2023-02-16 VITALS — BODY MASS INDEX: 17.97 KG/M2 | WEIGHT: 140 LBS | HEIGHT: 74 IN

## 2023-02-16 DIAGNOSIS — G89.29 CHRONIC BILATERAL LOW BACK PAIN WITHOUT SCIATICA: Primary | ICD-10-CM

## 2023-02-16 DIAGNOSIS — M54.50 CHRONIC BILATERAL LOW BACK PAIN WITHOUT SCIATICA: Primary | ICD-10-CM

## 2023-02-16 NOTE — LETTER
2/17/2023    Patient: Claudio Rogers   YOB: 2000   Date of Visit: 2/16/2023     Jesse Kerr, [address]  [place] [address]  Via Fax: [phone]    Dear Jesse Kerr MD,      Thank you for referring Mr. Renny lAcantar to [facility] for evaluation. My notes for this consultation are attached. If you have questions, please do not hesitate to call me. I look forward to following your patient along with you.       Sincerely,    Ted Leyva MD

## 2023-02-17 ENCOUNTER — OFFICE VISIT (OUTPATIENT)
Dept: PRIMARY CARE CLINIC | Age: 23
End: 2023-02-17
Payer: COMMERCIAL

## 2023-02-17 VITALS
SYSTOLIC BLOOD PRESSURE: 121 MMHG | TEMPERATURE: 97.5 F | DIASTOLIC BLOOD PRESSURE: 63 MMHG | RESPIRATION RATE: 16 BRPM | WEIGHT: 142.4 LBS | HEIGHT: 74 IN | HEART RATE: 74 BPM | OXYGEN SATURATION: 99 % | BODY MASS INDEX: 18.27 KG/M2

## 2023-02-17 DIAGNOSIS — N50.89 GENITAL LESION, MALE: Primary | ICD-10-CM

## 2023-02-17 RX ORDER — VALACYCLOVIR HYDROCHLORIDE 1 G/1
1000 TABLET, FILM COATED ORAL 2 TIMES DAILY
Qty: 14 TABLET | Refills: 0 | Status: SHIPPED | OUTPATIENT
Start: 2023-02-17 | End: 2023-02-24

## 2023-02-17 NOTE — PROGRESS NOTES
Identified pt with two pt identifiers(name and ). Reviewed record in preparation for visit and have obtained necessary documentation. Chief Complaint   Patient presents with    Rash     Started last night; red bumps on penis; recently shaved; possible new soap product      Vitals:    23 0824   BP: 121/63   Pulse: 74   Resp: 16   Temp: 97.5 °F (36.4 °C)   TempSrc: Temporal   SpO2: 99%   Weight: 142 lb 6.4 oz (64.6 kg)   Height: 6' 2\" (1.88 m)   PainSc:   0 - No pain       Health Maintenance Review: Patient reminded of \"due or due soon\" health maintenance. I have asked the patient to contact his/her primary care provider (PCP) for follow-up on his/her health maintenance. Coordination of Care Questionnaire:  :   1) Have you been to an emergency room, urgent care, or hospitalized since your last visit? If yes, where when, and reason for visit? no       2. Have seen or consulted any other health care provider since your last visit? If yes, where when, and reason for visit? NO      Patient is accompanied by self I have received verbal consent from Jerrell Parker to discuss any/all medical information while they are present in the room.

## 2023-02-17 NOTE — PROGRESS NOTES
Mariluz Carrasquillo (: 2000) is a 23 y.o. male, patient, here for evaluation of the following chief complaint(s):  Back Pain (Lower back pain for around 3 years. )       ASSESSMENT/PLAN:  Below is the assessment and plan developed based on review of pertinent history, physical exam, labs, studies, and medications. 1. Chronic bilateral low back pain without sciatica  -     XR SPINE LUMB 2 OR 3 V; Future  -     REFERRAL TO PHYSICAL THERAPY      Return in about 4 weeks (around 3/16/2023) for if symptoms have not resolved. He likely has muscular back pain but it has been present for a long time. We are going to start with physical therapy first.  He is going to take regular over-the-counter anti-inflammatories although we did discuss a prescription anti-inflammatory. If the symptoms do not improve with PT over the next 4 to 6 weeks we would have to consider an MRI. SUBJECTIVE/OBJECTIVE:  Mariluz Carrasquillo (: 2000) is a 23 y.o. male who presents today for the following:  Chief Complaint   Patient presents with    Back Pain     Lower back pain for around 3 years. The pain has been present for a long time. 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 He denies radiation of symptoms into his extremities. He denies any bowel or bladder dysfunction. He currently works with a [employer], does a lot of physical labor. He has been able to do it but with pain. IMAGING:    XR Results (most recent):  Results from Appointment encounter on 23    XR SPINE LUMB 2 OR 3 V    Narrative  2 view lumbar spine x-rays obtained today were reviewed and show no obvious fracture or other osseous abnormality. Vertebral body and intervertebral disc heights are well-maintained. There is no spondylolisthesis and no obvious spondylolysis.        Allergies   Allergen Reactions    Bee Venom Protein (Honey Bee) Swelling Current Outpatient Medications   Medication Sig    cetirizine (ZYRTEC) 10 mg tablet Take 10 mg by mouth daily. valACYclovir (VALTREX) 1 gram tablet Take 1 Tablet by mouth two (2) times a day for 7 days. clindamycin-benzoyl Peroxide (DUAC) 1.2 %(1 % base) -5 % SR topical gel     tretinoin (RETIN-A) 0.025 % topical cream      No current facility-administered medications for this visit.        Past Medical History:   Diagnosis Date    Allergic rhinitis     Diarrhea     diarrhea for past 6 weeks; just starting to clear    Infectious mononucleosis Oct. 2010    Seasonal allergic rhinitis 4/9/2012    Wears glasses         Past Surgical History:   Procedure Laterality Date    HX OTHER SURGICAL      testicle surgery       Family History   Problem Relation Age of Onset    Anxiety Mother     Colon Polyps Mother     Migraines Mother     Other Maternal Grandmother         diverticulosis    Colon Polyps Maternal Grandmother     Elevated Lipids Maternal Grandfather     Hypertension Maternal Grandfather     Cancer Paternal Grandmother 54        ovarian    Heart Disease Paternal Grandfather         Social History     Socioeconomic History    Marital status: SINGLE     Spouse name: Not on file    Number of children: Not on file    Years of education: Not on file    Highest education level: Not on file   Occupational History    Not on file   Tobacco Use    Smoking status: Never    Smokeless tobacco: Never   Substance and Sexual Activity    Alcohol use: No    Drug use: No    Sexual activity: Not on file   Other Topics Concern    Not on file   Social History Narrative    Not on file     Social Determinants of Health     Financial Resource Strain: Not on file   Food Insecurity: Not on file   Transportation Needs: Not on file   Physical Activity: Not on file   Stress: Not on file   Social Connections: Not on file   Intimate Partner Violence: Not on file   Housing Stability: Not on file       ROS:  ROS negative with the exception of the back. Vitals:  Ht 6' 2\" (1.88 m)   Wt 140 lb (63.5 kg)   BMI 17.97 kg/m²    Body mass index is 17.97 kg/m². Physical Exam    General: Alert, in no acute distress. Cardiac/Vascular: extremities warm and well-perfused x 4. Lungs: respirations non-labored. Abdomen: non-distended. Skin: no rashes or lesions. Neuro: appropriate for age, no focal deficits. HEENT: normocephalic, atraumatic. Musculoskeletal:   Focused exam of the back reveals no evidence of spinal dysraphism. There is no obvious asymmetry, pelvis and shoulders are level. The back is mildly tender in the paraspinal musculature of the lumbar spine. There is mild pain with flexion, rotation, and side-bending, no pain with back extension. The patient can toe and heel walk. There is 5/5 strength in all motor units, sensation is intact to light touch in the bilateral lower extremities. There is no patellar or achilles hyperreflexia. Toes are downgoing on Babinski and there is no clonus. Both lower extremities are warm and well-perfused. An electronic signature was used to authenticate this note.   -- Tiara Zavala MD

## 2023-02-17 NOTE — PROGRESS NOTES
Chief Complaint   Patient presents with    Rash     Started last night; red bumps on penis; recently shaved; possible new soap product   HISTORY OF PRESENT ILLNESS  Jerrell Parker is a 23 y.o. male. He is here for penile rash. He reports onset two days. He describes bumps that are not itchy or painful, noticed some blood. He notes recent unprotected sex. His female partner was testing and was not positive for anything. No Hx of STI in the past. He denies penile discharge. He denies fever or bladder changes. Review of Systems   Constitutional: Negative. Gastrointestinal: Negative. Genitourinary: Negative. Skin:  Positive for rash. Physical Exam  Vitals and nursing note reviewed. Exam conducted with a chaperone present. Cardiovascular:      Rate and Rhythm: Normal rate. Pulmonary:      Effort: Pulmonary effort is normal.   Genitourinary:     Penis: Circumcised. No tenderness. Testes: Normal.      Comments: Three scabbed vesicular lesion to glans penis and shaft, no localized LAD, surrounding erythema or exudates  Musculoskeletal:      Cervical back: Neck supple. Neurological:      Mental Status: He is alert. Past Medical History:   Diagnosis Date    Allergic rhinitis     Diarrhea     diarrhea for past 6 weeks; just starting to clear    Infectious mononucleosis Oct. 2010    Seasonal allergic rhinitis 4/9/2012    Wears glasses      Past Surgical History:   Procedure Laterality Date    HX OTHER SURGICAL      testicle surgery     /63 (BP 1 Location: Left arm, BP Patient Position: Sitting)   Pulse 74   Temp 97.5 °F (36.4 °C) (Temporal)   Resp 16   Ht 6' 2\" (1.88 m)   Wt 142 lb 6.4 oz (64.6 kg)   SpO2 99%   BMI 18.28 kg/m²    ASSESSMENT and PLAN  1. Genital lesion, male  -     CULTURE, VIRAL; Future  -     CHLAMYDIA / GC-AMPLIFIED  -     HIV 1/2 AG/AB, 4TH GENERATION,W RFLX CONFIRM; Future  -     RPR;  Future  -     HEP B SURFACE AG; Future  -     valACYclovir (VALTREX) 1 gram tablet; Take 1 Tablet by mouth two (2) times a day for 7 days. , Normal, Disp-14 Tablet, R-0  Highly suspicious of HSV infection. Labs pending, will call with results. Discussed safe sex practices.  Follow up prn    ISAÍAS Ortega

## 2023-02-19 LAB
HBV SURFACE AG SER QL: <0.1 INDEX
HBV SURFACE AG SER QL: NEGATIVE
HIV 1+2 AB+HIV1 P24 AG SERPL QL IA: NONREACTIVE
HIV12 RESULT COMMENT, HHIVC: NORMAL
RPR SER QL: NONREACTIVE

## 2023-02-24 ENCOUNTER — PATIENT MESSAGE (OUTPATIENT)
Dept: URGENT CARE | Age: 23
End: 2023-02-24

## 2023-05-10 ENCOUNTER — HOSPITAL ENCOUNTER (EMERGENCY)
Facility: HOSPITAL | Age: 23
Discharge: HOME | End: 2023-05-10
Attending: EMERGENCY MEDICINE
Payer: COMMERCIAL

## 2023-05-10 ENCOUNTER — APPOINTMENT (OUTPATIENT)
Facility: HOSPITAL | Age: 23
End: 2023-05-10
Payer: COMMERCIAL

## 2023-05-10 VITALS
OXYGEN SATURATION: 100 % | DIASTOLIC BLOOD PRESSURE: 69 MMHG | SYSTOLIC BLOOD PRESSURE: 107 MMHG | RESPIRATION RATE: 14 BRPM | TEMPERATURE: 97.3 F | HEART RATE: 72 BPM | BODY MASS INDEX: 18.02 KG/M2 | HEIGHT: 74 IN | WEIGHT: 140.43 LBS

## 2023-05-10 DIAGNOSIS — I86.1 VARICOCELE: Primary | ICD-10-CM

## 2023-05-10 PROCEDURE — 99284 EMERGENCY DEPT VISIT MOD MDM: CPT

## 2023-05-10 PROCEDURE — 87491 CHLMYD TRACH DNA AMP PROBE: CPT

## 2023-05-10 PROCEDURE — 87591 N.GONORRHOEAE DNA AMP PROB: CPT

## 2023-05-10 PROCEDURE — 76870 US EXAM SCROTUM: CPT

## 2023-05-10 ASSESSMENT — PAIN SCALES - GENERAL: PAINLEVEL_OUTOF10: 6

## 2023-05-10 NOTE — ED PROVIDER NOTES
[facility] EMERGENCY DEPT  EMERGENCY DEPARTMENT ENCOUNTER       Pt Name: Tiera Shi  MRN: [de-identified]  [place] 2000  Date of evaluation: 5/10/2023  Provider: Andre Woods MD   PCP: Alonzo Heart MD  Note Started: 10:42 PM 5/10/23     CHIEF COMPLAINT       Chief Complaint   Patient presents with    Testicle Pain     About a month of left testicle pain. He had a hernia when he was 4. He is worried it has come out. Pt has had the pain off and on for a month but so bad  he was pale. HISTORY OF PRESENT ILLNESS: 1 or more elements      History From: Patient, History limited by: None     Tiera Shi is a 23 y.o. male who presents with testicular pain. He reports over the past month he has had episodes of pain to his left testicle. These episodes last about 5 hours in duration, radiate to his left lower quadrant. Some associated lightheadedness, no nausea or vomiting. No noted swelling to his scrotum. He does report some scant penile discharge. History of left inguinal hernia, repaired as an infant at 5years old     Nursing Notes were all reviewed and agreed with or any disagreements were addressed in the HPI. REVIEW OF SYSTEMS        Positives and Pertinent negatives as per HPI.     PAST HISTORY     Past Medical History:  Past Medical History:   Diagnosis Date    Allergic rhinitis     Diarrhea     diarrhea for past 6 weeks; just starting to clear    Infectious mononucleosis Oct. 2010    Seasonal allergic rhinitis 4/9/2012    Wears glasses        Past Surgical History:  Past Surgical History:   Procedure Laterality Date    OTHER SURGICAL HISTORY      testicle surgery       Family History:  Family History   Problem Relation Age of Onset    Other Maternal Grandmother         diverticulosis    Migraines Mother     Colon Polyps Mother     Anxiety Disorder Mother     Heart Disease Paternal Grandfather     Elevated Lipids Maternal Grandfather     Hypertension Maternal Grandfather     Cancer

## 2023-05-10 NOTE — ED NOTES
D/c paperwork reviewed w/ pt, no questions at this time. Pt is ambulatory at d/c.       Ge Silverio RN  05/10/23 3482

## 2023-05-11 ENCOUNTER — CARE COORDINATION (OUTPATIENT)
Dept: OTHER | Facility: CLINIC | Age: 23
End: 2023-05-11

## 2023-05-11 LAB
C TRACH DNA SPEC QL NAA+PROBE: NEGATIVE
N GONORRHOEA DNA SPEC QL NAA+PROBE: NEGATIVE
SAMPLE TYPE: NORMAL
SERVICE CMNT-IMP: NORMAL
SPECIMEN SOURCE: NORMAL

## 2023-05-11 NOTE — CARE COORDINATION
Care Coordination Assessment    Care Coordination Protocol  Referral from Primary Care Provider: No  Week 1 - Initial Assessment     Do you have all of your prescriptions and are they filled?: Yes  Barriers to medication adherence: None  Are you able to afford your medications?: Yes  How often do you have trouble taking your medications the way you have been told to take them?: I do not have to take medications. Do you have Home O2 Therapy?: No      Ability to seek help/take action for Emergent Urgent situations i.e. fire, crime, inclement weather or health crisis. : Independent  Ability to ambulate to restroom: Independent  Ability handle personal hygeine needs (bathing/dressing/grooming): Independent  Ability to manage Medications: Independent  Ability to prepare Food Preparation: Independent  Ability to maintain home (clean home, laundry): Independent  Ability to drive and/or has transportation: Independent  Ability to do shopping: Independent  Ability to manage finances: Independent  Is patient able to live independently?: Yes     Current Housing: Private Residence        Per the Fall Risk Screening, did the patient have 2 or more falls or 1 fall with injury in the past year?: No     Frequent urination at night?: No  Do you use rails/bars?: No  Do you have a non-slip tub mat?: No     Are you experiencing loss of meaning?: No  Are you experiencing loss of hope and peace?: No     Suggested Interventions and Community Resources         Set up/Review Goals              No future appointments.

## 2023-05-18 ENCOUNTER — CARE COORDINATION (OUTPATIENT)
Dept: OTHER | Facility: CLINIC | Age: 23
End: 2023-05-18

## 2023-05-18 NOTE — CARE COORDINATION
Ambulatory Care Coordination Note  5/18/2023    Called pt to follow up on progress, reinforce previous education/ provide pt education, and discuss any new issues or concerns. HIPAA compliant message left requesting a return phone call at patients convenience to discuss. Will continue to follow. Lab Results       None            Care Coordination Interventions    Referral from Primary Care Provider: No  Suggested Interventions and Community Resources          Goals Addressed    None         No future appointments.

## 2023-05-30 ENCOUNTER — CARE COORDINATION (OUTPATIENT)
Dept: OTHER | Facility: CLINIC | Age: 23
End: 2023-05-30

## 2023-05-30 NOTE — CARE COORDINATION
Ambulatory Care Coordination Note  5/30/2023    Ambulatory Care Coordination Note    ACM attempted to reach patient for care management follow up call regarding recent ED visit. HIPAA compliant message left requesting a return phone call at patient convenience. Plan for follow-up call in 10-14 days    No future appointments. Lab Results       None            Care Coordination Interventions    Referral from Primary Care Provider: No  Suggested Interventions and Community Resources          Goals Addressed    None         No future appointments.

## 2023-06-20 ENCOUNTER — CARE COORDINATION (OUTPATIENT)
Dept: OTHER | Facility: CLINIC | Age: 23
End: 2023-06-20

## 2023-06-20 NOTE — CARE COORDINATION
Ambulatory Care Coordination Note  2023    Patient Current Location:  Home: [address] [address] [address] [id number]     ACM contacted the patient by telephone. Verified name and  with patient as identifiers. Provided introduction to self, and explanation of the ACM role. Challenges to be reviewed by the provider   Additional needs identified to be addressed with provider: No  none               Method of communication with provider: none. ACM: Mila Galloway RN    Patient has graduated from the Complex Case Management  program on 2023. Patient/family has the ability to self-manage at this time. Care management goals have been completed. No further Ambulatory Care Manager follow up scheduled. Goals Addressed                   This Visit's Progress     COMPLETED: Attends follow up appointments on schedule        Patient will call and schedule an appointment with urology, Dr. Lopez Melvin, by 5/15/2023              Patient has Ambulatory Care Manager's contact information for any further questions, concerns, or needs. Patients upcoming visits:  No future appointments. Lab Results       None            Care Coordination Interventions    Referral from Primary Care Provider: No  Suggested Interventions and Community Resources          Goals Addressed                   This Visit's Progress     COMPLETED: Attends follow up appointments on schedule        Patient will call and schedule an appointment with urology, Dr. Lopez Melvin, by 5/15/2023              No future appointments.

## 2023-10-16 ENCOUNTER — HOSPITAL ENCOUNTER (EMERGENCY)
Facility: HOSPITAL | Age: 23
Discharge: HOME | End: 2023-10-16
Attending: STUDENT IN AN ORGANIZED HEALTH CARE EDUCATION/TRAINING PROGRAM
Payer: COMMERCIAL

## 2023-10-16 ENCOUNTER — APPOINTMENT (OUTPATIENT)
Facility: HOSPITAL | Age: 23
End: 2023-10-16
Payer: COMMERCIAL

## 2023-10-16 VITALS
DIASTOLIC BLOOD PRESSURE: 84 MMHG | HEIGHT: 74 IN | RESPIRATION RATE: 16 BRPM | TEMPERATURE: 98.2 F | OXYGEN SATURATION: 100 % | HEART RATE: 68 BPM | BODY MASS INDEX: 19.04 KG/M2 | WEIGHT: 148.37 LBS | SYSTOLIC BLOOD PRESSURE: 126 MMHG

## 2023-10-16 DIAGNOSIS — S09.90XA INJURY OF HEAD, INITIAL ENCOUNTER: Primary | ICD-10-CM

## 2023-10-16 DIAGNOSIS — S06.0X0A CONCUSSION WITHOUT LOSS OF CONSCIOUSNESS, INITIAL ENCOUNTER: ICD-10-CM

## 2023-10-16 PROCEDURE — 99284 EMERGENCY DEPT VISIT MOD MDM: CPT

## 2023-10-16 PROCEDURE — 70450 CT HEAD/BRAIN W/O DYE: CPT

## 2023-10-16 PROCEDURE — 6370000000 HC RX 637 (ALT 250 FOR IP): Performed by: STUDENT IN AN ORGANIZED HEALTH CARE EDUCATION/TRAINING PROGRAM

## 2023-10-16 RX ORDER — ACETAMINOPHEN 500 MG
1000 TABLET ORAL
Status: COMPLETED | OUTPATIENT
Start: 2023-10-16 | End: 2023-10-16

## 2023-10-16 RX ORDER — ONDANSETRON 4 MG/1
4 TABLET, FILM COATED ORAL 3 TIMES DAILY PRN
Qty: 15 TABLET | Refills: 0 | Status: SHIPPED | OUTPATIENT
Start: 2023-10-16

## 2023-10-16 RX ADMIN — ACETAMINOPHEN 1000 MG: 500 TABLET ORAL at 20:35

## 2023-10-16 ASSESSMENT — PAIN DESCRIPTION - LOCATION
LOCATION: HEAD
LOCATION: HEAD

## 2023-10-16 ASSESSMENT — PAIN - FUNCTIONAL ASSESSMENT: PAIN_FUNCTIONAL_ASSESSMENT: 0-10

## 2023-10-16 ASSESSMENT — PAIN SCALES - GENERAL
PAINLEVEL_OUTOF10: 7
PAINLEVEL_OUTOF10: 7
PAINLEVEL_OUTOF10: 3

## 2023-10-16 ASSESSMENT — PAIN DESCRIPTION - PAIN TYPE: TYPE: ACUTE PAIN

## 2023-10-16 ASSESSMENT — LIFESTYLE VARIABLES
HOW MANY STANDARD DRINKS CONTAINING ALCOHOL DO YOU HAVE ON A TYPICAL DAY: PATIENT DOES NOT DRINK
HOW OFTEN DO YOU HAVE A DRINK CONTAINING ALCOHOL: NEVER

## 2023-10-16 ASSESSMENT — PAIN DESCRIPTION - ONSET: ONSET: ON-GOING

## 2023-10-16 ASSESSMENT — PAIN DESCRIPTION - FREQUENCY: FREQUENCY: CONTINUOUS

## 2023-10-16 ASSESSMENT — PAIN DESCRIPTION - DESCRIPTORS: DESCRIPTORS: ACHING

## 2023-10-17 ENCOUNTER — CARE COORDINATION (OUTPATIENT)
Dept: OTHER | Facility: CLINIC | Age: 23
End: 2023-10-17

## 2023-10-17 NOTE — CARE COORDINATION
Ambulatory Care Manager [facility]) contacted the patient to introduce the Associate Care Management Program related to ED visit to [facility] for concussion. ACM reviewed and updated n/a patient was agreeable to follow up Care Management calls. Summary Note: Patient states she is feeling better. Pain is 4/10, without ibuprofen. Patient plans to take ibuprofen for HA pain. Encouraged him to take this with food, he states she understands. Provided Education:  Discussed red flags and appropriate site of care based on symptoms and resources available to patient including: PCP  Benefits related nurse triage line  Urgent care clinics  MyChart Messaging. Provided the following associate/dependent related resources: [facility] Find a Provider: [email or web address]  Nurse Access line 24/7 # [phone]    Discharge instructions advised patient to make appt with [facility] post concussion rehab - [address] [place] [address], [address] [place] [address] [address], [address] [place] [address] Road [facility], [phone]. Importance and benefits of: Follow up with PCP and specialist, medication adherence, self monitoring and reporting of symptoms. Plan:  ACM provided contact information for future needs. Plan for follow-up call in 5-7 days based on severity of symptoms and risk factors. Plan for next call: Review and update: medication     Follow up on: symptom management-HA pain? Patient  verbalized understanding and is agreeable to follow up call.

## 2023-10-17 NOTE — ED NOTES
Provider at the bedside at this time talking to the patient/family.        Chiquita Ramos RN  10/16/23 6721

## 2023-10-17 NOTE — ED NOTES
DC info reviewed with patient, all questions answered. Patient well-appearing at time of discharge and vital signs stable. Ambulatory out of ED at this time.        Princess Anna RN  10/16/23 6483
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
## 2023-10-17 NOTE — DISCHARGE INSTRUCTIONS
You have been evaluated in the Emergency Department today for your head injury. Your CT scan did not show signs of bleeds or fractures in your head. We recommend you take 600mg ibuprofen every 6 hours or tylenol 650mg every 6 hours as needed for pain. If needed, you can alternate these medications so that you take one medication every 3 hours. For instance, at noon take ibuprofen, then at 3pm take tylenol, then at 6pm take ibuprofen. Please schedule an appointment for follow up with your primary care physician as soon as possible. Return to the Emergency Department if you experience worsening or uncontrolled pain, vision changes, recurrent vomiting, difficulty with normal activities, abnormal behavior, difficulty walking, numbness, weakness, or any other concerning symptoms. Thank you for choosing us for your care.

## 2023-10-17 NOTE — ED NOTES
Patient discharged from the ED by Dr. Dick Martinez. Diagnosis, medications, precautions and follow-ups were reviewed with the patient/family. Questions were asked and answered prior to departure. Patient departed the ED via ambulation and was accompanied by himself.        Heena Cheung RN  10/16/23 2297

## 2023-10-24 ENCOUNTER — CARE COORDINATION (OUTPATIENT)
Dept: OTHER | Facility: CLINIC | Age: 23
End: 2023-10-24

## 2023-10-24 NOTE — CARE COORDINATION
Resolving current episode of case management. Patient has met patient stated and/or medical goals. Patient consistenly demonstrates understanding of the medical action plan through execution of plan. Appointments with key providers are scheduled and attended. Plan of care is modified and updated to address new challenges and barriers with minimal support from the CM team(proactively uses physicians and community resources) The support system remains current and has been modified as needed. Patient continues to acquire needed resources from the current support system established. Teach back demonstrates that patient understands education for self management of chronic conditions. Patient consistenly communicates understanding of signs,symptoms and complications for all major diagnoses. Patient modifies his/her lifestyle toreduce or avoid risk factors to his/her health. ECM will follow as needed and patient has ECM contact information for future needs. Patient states he is feeling better, no symptoms. No needs at this time. Encouraged him to reach out should that change. Patient appreciative.

## 2024-03-01 ENCOUNTER — OFFICE VISIT (OUTPATIENT)
Age: 24
End: 2024-03-01

## 2024-03-01 ENCOUNTER — NURSE TRIAGE (OUTPATIENT)
Dept: OTHER | Facility: CLINIC | Age: 24
End: 2024-03-01

## 2024-03-01 VITALS
OXYGEN SATURATION: 100 % | WEIGHT: 152 LBS | BODY MASS INDEX: 19.52 KG/M2 | TEMPERATURE: 97.2 F | DIASTOLIC BLOOD PRESSURE: 76 MMHG | SYSTOLIC BLOOD PRESSURE: 121 MMHG | RESPIRATION RATE: 21 BRPM | HEART RATE: 63 BPM

## 2024-03-01 DIAGNOSIS — Z11.3 ROUTINE SCREENING FOR STI (SEXUALLY TRANSMITTED INFECTION): ICD-10-CM

## 2024-03-01 DIAGNOSIS — R30.9 URINARY PAIN: Primary | ICD-10-CM

## 2024-03-01 DIAGNOSIS — L98.9 SKIN LESION, SUPERFICIAL: ICD-10-CM

## 2024-03-01 LAB
BILIRUBIN, URINE, POC: NEGATIVE
BLOOD URINE, POC: NEGATIVE
GLUCOSE URINE, POC: NEGATIVE
KETONES, URINE, POC: NEGATIVE
LEUKOCYTE ESTERASE, URINE, POC: NEGATIVE
NITRITE, URINE, POC: NEGATIVE
PH, URINE, POC: 7.5 (ref 4.6–8)
PROTEIN,URINE, POC: NEGATIVE
SPECIFIC GRAVITY, URINE, POC: 1.02 (ref 1–1.03)
URINALYSIS CLARITY, POC: CLEAR
URINALYSIS COLOR, POC: YELLOW
UROBILINOGEN, POC: NORMAL

## 2024-03-01 RX ORDER — VALACYCLOVIR HYDROCHLORIDE 1 G/1
1000 TABLET, FILM COATED ORAL 3 TIMES DAILY
Qty: 21 TABLET | Refills: 0 | Status: SHIPPED | OUTPATIENT
Start: 2024-03-01 | End: 2024-03-08

## 2024-03-01 NOTE — PROGRESS NOTES
Chief Complaint   Patient presents with    Exposure to STD     Needing testing done, some itchiness          Exposure to STD  The patient's primary symptoms include genital lesions. The patient's pertinent negatives include no genital itching, penile discharge, penile pain or testicular pain. This is a new problem. The current episode started yesterday. The problem has been unchanged. The patient is experiencing no pain. Associated symptoms include a rash. Pertinent negatives include no chills, dysuria, fever or urgency. Associated symptoms comments: Noticed small grayson of raised bumps on shaft of penis   No local pain- irritation or itching   No discharge.       Past Medical History:   Diagnosis Date    Allergic rhinitis     Diarrhea     diarrhea for past 6 weeks; just starting to clear    Infectious mononucleosis Oct. 2010    Seasonal allergic rhinitis 4/9/2012    Wears glasses        Past Surgical History:   Procedure Laterality Date    OTHER SURGICAL HISTORY      testicle surgery       Social History     Tobacco Use    Smoking status: Never     Passive exposure: Never    Smokeless tobacco: Never   Substance Use Topics    Alcohol use: No    Drug use: No        Allergies   Allergen Reactions    Bee Venom Swelling        Review of Systems   Constitutional:  Negative for chills and fever.   Genitourinary:  Negative for dysuria, penile discharge, penile pain, testicular pain and urgency.   Skin:  Positive for rash.   All other systems reviewed and are negative.       /76 (Site: Left Upper Arm, Position: Sitting, Cuff Size: Large Adult)   Pulse 63   Temp 97.2 °F (36.2 °C)   Resp 21   Wt 68.9 kg (152 lb)   SpO2 100%   BMI 19.52 kg/m²      Physical Exam  Vitals and nursing note reviewed.   Constitutional:       General: He is not in acute distress.     Appearance: Normal appearance. He is not ill-appearing.   Genitourinary:     Penis: Lesions (cluste of non tender lesions on dorsal- no bliter or crusting)

## 2024-03-02 DIAGNOSIS — R30.9 URINARY PAIN: ICD-10-CM

## 2024-03-02 DIAGNOSIS — Z11.3 ROUTINE SCREENING FOR STI (SEXUALLY TRANSMITTED INFECTION): ICD-10-CM

## 2024-03-02 NOTE — TELEPHONE ENCOUNTER
[facility] [place]    Subjective: Caller states \"My son went to [facility] because the health plan only covers [facility] because he has blisters on his penis that is probably herpes but they do not test for Herpes and we don't know what else to do.\"     Current Symptoms: sores on penis, itching    Onset: 1 day ago;     Associated Symptoms: NA    Pain Severity: 0/10    Temperature: denies     What has been tried: nothing    LMP: NA Pregnant: No    Recommended disposition: See PCP within 24 Hours    Care advice provided, patient verbalizes understanding; denies any other questions or concerns; instructed to call back for any new or worsening symptoms.    Pt's mother states that the health plan only covers [facility] facilities. Pt waited in the [facility] for a couple of hours and was seen but they do not do Herpes testing. Pt's mother called PCP office and they [facility] but [facility] is not covered anywhere except [place]. Mother very frustrated. RN advises pt be seen in local [facility] ED but mother and pt refuse at this time. Instruct mother to have pt call back with fever or any worsening symptoms.    Attention Provider:  Thank you for allowing me to participate in the care of your patient.  The patient was connected to triage in response to symptoms provided.   Please do not respond through this encounter as the response is not directed to a shared pool.      Reason for Disposition   [1] Tiny water blisters rash AND [2] 3 or more    Protocols used: Penis and Scrotum Symptoms-ADULT-

## 2024-03-06 LAB
C TRACH RRNA SPEC QL NAA+PROBE: NEGATIVE
N GONORRHOEA RRNA SPEC QL NAA+PROBE: NEGATIVE
SPECIMEN SOURCE: NORMAL
T VAGINALIS RRNA SPEC QL NAA+PROBE: NEGATIVE